# Patient Record
Sex: MALE | Race: OTHER | Employment: OTHER | URBAN - METROPOLITAN AREA
[De-identification: names, ages, dates, MRNs, and addresses within clinical notes are randomized per-mention and may not be internally consistent; named-entity substitution may affect disease eponyms.]

---

## 2021-11-19 NOTE — PATIENT DISCUSSION
Discussed r/b of biopsy in detail with the patient today.  Patient expressed understanding and wishes to proceed today.  Lesion was excised today without difficulty and a specimen was sent to pathology for further evaluation.  Patient was given written post-operative instructions.

## 2022-07-05 RX ORDER — ATORVASTATIN CALCIUM 40 MG/1
TABLET, FILM COATED ORAL
COMMUNITY
Start: 2021-12-21 | End: 2022-10-28 | Stop reason: SDUPTHER

## 2022-07-05 RX ORDER — NEBIVOLOL 2.5 MG/1
TABLET ORAL
COMMUNITY

## 2022-07-05 RX ORDER — OLMESARTAN MEDOXOMIL, AMLODIPINE AND HYDROCHLOROTHIAZIDE TABLET 20/5/12.5 MG 20; 5; 12.5 MG/1; MG/1; MG/1
TABLET ORAL
COMMUNITY

## 2022-10-28 PROBLEM — E11.9 DIABETES MELLITUS (HCC): Status: ACTIVE | Noted: 2022-10-28

## 2022-10-28 PROBLEM — E78.00 HYPERCHOLESTEROLEMIA: Status: ACTIVE | Noted: 2022-10-28

## 2022-10-28 PROBLEM — I10 HYPERTENSIVE DISORDER: Status: ACTIVE | Noted: 2022-10-28

## 2022-12-08 ENCOUNTER — NEW PATIENT (OUTPATIENT)
Dept: URBAN - METROPOLITAN AREA CLINIC 9 | Facility: CLINIC | Age: 67
End: 2022-12-08

## 2022-12-08 PROCEDURE — 92015 DETERMINE REFRACTIVE STATE: CPT

## 2022-12-08 PROCEDURE — 92004 COMPRE OPH EXAM NEW PT 1/>: CPT

## 2022-12-08 ASSESSMENT — VISUAL ACUITY
OD_CC: 20/40-1
OD_GLARE: 20/80
OS_CC: J1
OU_CC: 20/30-1
OD_CC: J3
OS_CC: 20/30-1
OU_CC: J1
OS_GLARE: 20/40

## 2022-12-08 ASSESSMENT — KERATOMETRY
OS_AXISANGLE2_DEGREES: 103
OS_K1POWER_DIOPTERS: 46.25
OS_AXISANGLE_DEGREES: 013
OD_K2POWER_DIOPTERS: 46.50
OD_K1POWER_DIOPTERS: 45.75
OS_K2POWER_DIOPTERS: 47.25
OD_AXISANGLE_DEGREES: 159
OD_AXISANGLE2_DEGREES: 69

## 2022-12-08 ASSESSMENT — TONOMETRY
OD_IOP_MMHG: 13
OS_IOP_MMHG: 13

## 2022-12-14 ENCOUNTER — ESTABLISHED PATIENT (OUTPATIENT)
Dept: URBAN - METROPOLITAN AREA CLINIC 9 | Facility: CLINIC | Age: 67
End: 2022-12-14

## 2022-12-14 PROCEDURE — 92136 OPHTHALMIC BIOMETRY: CPT

## 2022-12-14 PROCEDURE — 92014 COMPRE OPH EXAM EST PT 1/>: CPT

## 2022-12-14 ASSESSMENT — VISUAL ACUITY
OD_SC: 20/400
OS_SC: 20/100
OU_SC: 20/80-1
OD_GLARE: 20/40
OS_GLARE: 20/40

## 2022-12-14 ASSESSMENT — KERATOMETRY
OD_AXISANGLE_DEGREES: 4
OD_K2POWER_DIOPTERS: 48.00
OD_AXISANGLE2_DEGREES: 94
OS_K1POWER_DIOPTERS: 46.50
OS_AXISANGLE_DEGREES: 52
OS_AXISANGLE2_DEGREES: 142
OS_K2POWER_DIOPTERS: 47.00
OD_K1POWER_DIOPTERS: 46.25

## 2022-12-14 ASSESSMENT — TONOMETRY
OD_IOP_MMHG: 15
OS_IOP_MMHG: 16

## 2023-01-03 ENCOUNTER — PRE-OP/H&P (OUTPATIENT)
Dept: URBAN - METROPOLITAN AREA CLINIC 9 | Facility: CLINIC | Age: 68
End: 2023-01-03

## 2023-01-03 DIAGNOSIS — H25.13: ICD-10-CM

## 2023-01-03 PROCEDURE — 99211PRE PRE OP VISIT

## 2023-01-17 NOTE — PATIENT DISCUSSION
DISCUSSED COSMETIC TRANSCONJUNCTIVAL LOWER EYELID BLEPHAROPLASTY WITH CO2 LASER RESURFACING, BILATERAL.

## 2023-01-30 ENCOUNTER — PRE-OP/H&P (OUTPATIENT)
Dept: URBAN - METROPOLITAN AREA CLINIC 9 | Facility: CLINIC | Age: 68
End: 2023-01-30

## 2023-01-30 DIAGNOSIS — H25.13: ICD-10-CM

## 2023-01-30 DIAGNOSIS — H04.123: ICD-10-CM

## 2023-01-30 PROCEDURE — 99211PRE PRE OP VISIT

## 2023-01-30 ASSESSMENT — KERATOMETRY
OD_AXISANGLE_DEGREES: 3
OS_AXISANGLE2_DEGREES: 139
OS_K1POWER_DIOPTERS: 46.50
OS_K2POWER_DIOPTERS: 47.25
OD_K2POWER_DIOPTERS: 48.00
OS_AXISANGLE_DEGREES: 49
OD_AXISANGLE2_DEGREES: 93
OD_K1POWER_DIOPTERS: 46.25